# Patient Record
Sex: FEMALE | HISPANIC OR LATINO
[De-identification: names, ages, dates, MRNs, and addresses within clinical notes are randomized per-mention and may not be internally consistent; named-entity substitution may affect disease eponyms.]

---

## 2021-09-27 ENCOUNTER — APPOINTMENT (OUTPATIENT)
Dept: OTOLARYNGOLOGY | Facility: CLINIC | Age: 26
End: 2021-09-27
Payer: MEDICAID

## 2021-09-27 VITALS
SYSTOLIC BLOOD PRESSURE: 117 MMHG | BODY MASS INDEX: 22.2 KG/M2 | HEIGHT: 64 IN | DIASTOLIC BLOOD PRESSURE: 73 MMHG | OXYGEN SATURATION: 95 % | HEART RATE: 85 BPM | TEMPERATURE: 97.7 F | WEIGHT: 130 LBS

## 2021-09-27 DIAGNOSIS — Z78.9 OTHER SPECIFIED HEALTH STATUS: ICD-10-CM

## 2021-09-27 DIAGNOSIS — Z82.49 FAMILY HISTORY OF ISCHEMIC HEART DISEASE AND OTHER DISEASES OF THE CIRCULATORY SYSTEM: ICD-10-CM

## 2021-09-27 DIAGNOSIS — H61.21 IMPACTED CERUMEN, RIGHT EAR: ICD-10-CM

## 2021-09-27 DIAGNOSIS — H60.502 UNSPECIFIED ACUTE NONINFECTIVE OTITIS EXTERNA, LEFT EAR: ICD-10-CM

## 2021-09-27 PROBLEM — Z00.00 ENCOUNTER FOR PREVENTIVE HEALTH EXAMINATION: Status: ACTIVE | Noted: 2021-09-27

## 2021-09-27 PROCEDURE — 99204 OFFICE O/P NEW MOD 45 MIN: CPT | Mod: 25

## 2021-09-27 PROCEDURE — 69210 REMOVE IMPACTED EAR WAX UNI: CPT

## 2021-09-27 RX ORDER — OFLOXACIN OTIC 3 MG/ML
0.3 SOLUTION AURICULAR (OTIC)
Qty: 1 | Refills: 0 | Status: ACTIVE | COMMUNITY
Start: 2021-09-27 | End: 1900-01-01

## 2021-09-27 RX ORDER — PREDNISOLONE ACETATE 10 MG/ML
1 SUSPENSION/ DROPS OPHTHALMIC
Qty: 1 | Refills: 0 | Status: ACTIVE | COMMUNITY
Start: 2021-09-27 | End: 1900-01-01

## 2021-09-27 NOTE — PHYSICAL EXAM
[Binocular Microscopic Exam] : Binocular microscopic exam was performed [FreeTextEntry8] : cerumen impaction removed with suction [FreeTextEntry9] : inflamed canal cleared of debris w/ suction [Normal] : no rashes

## 2021-09-27 NOTE — HISTORY OF PRESENT ILLNESS
[de-identified] : Last week had R ear pain and a "pounding" sensation (but not sound) which has now moved to the L side. No hearing loss & pain level now 5/10. Denies qtip use but wears OTE headphones all day.